# Patient Record
Sex: FEMALE | Race: WHITE | NOT HISPANIC OR LATINO | URBAN - METROPOLITAN AREA
[De-identification: names, ages, dates, MRNs, and addresses within clinical notes are randomized per-mention and may not be internally consistent; named-entity substitution may affect disease eponyms.]

---

## 2020-06-05 ENCOUNTER — OFFICE VISIT (OUTPATIENT)
Dept: URBAN - METROPOLITAN AREA TELEHEALTH 2 | Facility: TELEHEALTH | Age: 40
End: 2020-06-05
Payer: COMMERCIAL

## 2020-06-05 DIAGNOSIS — K62.5 RECTAL BLEEDING: ICD-10-CM

## 2020-06-05 DIAGNOSIS — R94.5 ABNORMAL LIVER FUCTION TESTS: ICD-10-CM

## 2020-06-05 DIAGNOSIS — K51.80 OTHER ULCERATIVE COLITIS WITHOUT COMPLICATION: ICD-10-CM

## 2020-06-05 PROCEDURE — 99213 OFFICE O/P EST LOW 20 MIN: CPT | Performed by: NURSE PRACTITIONER

## 2020-06-05 PROCEDURE — G8427 DOCREV CUR MEDS BY ELIG CLIN: HCPCS | Performed by: NURSE PRACTITIONER

## 2020-06-05 RX ORDER — BUDESONIDE 28 MG/1
1 APPLICATION BID X 2 WEEKS THEN QHS X 4 WEEKS AEROSOL, FOAM RECTAL TWICE A DAY
Qty: 4 CARTRIDGE | Refills: 2 | OUTPATIENT
Start: 2020-06-05 | End: 2020-09-03

## 2020-06-05 RX ORDER — INFLIXIMAB 100 MG/10ML
INFUSE 5 MG/KG OVER NO LESS THAN 2-6 HOUR(S) BY INTRAVENOUS ROUTE EVERY 8 WEEKS. PRE-MEDICATE WITH TYLENOL 650MG PO & BENADRYL 25MG IV PRIOR TO EACH INFUSION INJECTION, POWDER, LYOPHILIZED, FOR SOLUTION INTRAVENOUS
Qty: 1 | Refills: 6 | COMMUNITY
Start: 2017-08-31

## 2020-06-05 RX ORDER — CHLORHEXIDINE GLUCONATE 4 %
LIQUID (ML) TOPICAL
Qty: 0 | Refills: 0 | COMMUNITY
Start: 1900-01-01

## 2020-06-05 RX ORDER — MESALAMINE 1000 MG/1
INSERT 1 SUPPOSITORY BY RECTAL ROUTE ONCE A DAY (AT BEDTIME) SUPPOSITORY RECTAL 1
Qty: 30 | Refills: 11 | COMMUNITY
Start: 2019-11-08 | End: 2020-11-02

## 2020-06-05 NOTE — HPI-TODAY'S VISIT:
2020 Margy presents for follow up of UP. She is doing well today. Her symptoms have resolved. She is doing well on Remicade with normal BMs or bleeding. MB  2020 Margy presents for follow up fo UP. She is doing better in regard to her syptoms with no further bleeding. 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 MB  3/24/20 Margy presents today for a telehealth visit via [email or web address].  She was having bleeding at our last visit.  She did uceris foam and colace.  Now she is working 2 jobs and she has had increase stress.  She feels that things are taking a toll, but she is almost down to one shift.  So, now she is having bloating, cramping and mucus with some blood.  She started using the foam, and she is now better.  She is concerned because she feels that she is getting exposed to COVID 19 patients.  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  WE have discussed her increased risk and the importance of her maintaining on Remicade.  15 minutes were spent on this video call  19 Margy presents for follow up of UC on Remicade 5mg/kg every 8 weeks. Since her last visit she developed constipation x 5 days with rectal pain and impaction. She had BRBPR on disimpaction and has had discomfort/bleeding since. She denies fever, chills, abdominal pain or diarrhea, her stools are actually formed. Her last colonoscpy was in 2018 by Dr. Gutierrez with mild rectal disease. MB   Patient seen today via telehealth by agreement and consent of patient in light of current COVID-19 pandemic. I used video conferencing during the visit. The patient encounter is appropriate and reasonable under the circumstances given the patient's particular presentation at this time. The patient has been advised of the followin) the potential risks and limitations of this mode of treatment (including but not limited to the absence of in-person examination); 2) the right to refuse telehealth services at any point without affecting the right to future care; 3) the right to receive in-person services, included immediately after this consultation if an urgent need arises; 4) information, including identifiable images or information from this telehealth consult, will only be shared in accordance with HIPAA regulations. Any and all of the patient's and/or patient's family member's questions on this issue have been answered. The patient has verbally consented to be treated via telehealth services. The patient has also been advised to contact this office for worsening conditions or problems, and seek emergency medical treatment and/or call 911 if the patient deems either necessary.

## 2020-07-02 ENCOUNTER — OFFICE VISIT (OUTPATIENT)
Dept: URBAN - METROPOLITAN AREA TELEHEALTH 2 | Facility: TELEHEALTH | Age: 40
End: 2020-07-02
Payer: COMMERCIAL

## 2020-07-02 ENCOUNTER — TELEPHONE ENCOUNTER (OUTPATIENT)
Dept: URBAN - NONMETROPOLITAN AREA CLINIC 2 | Facility: CLINIC | Age: 40
End: 2020-07-02

## 2020-07-02 DIAGNOSIS — K51.80 OTHER ULCERATIVE COLITIS WITHOUT COMPLICATION: ICD-10-CM

## 2020-07-02 DIAGNOSIS — R94.5 ABNORMAL LIVER FUCTION TESTS: ICD-10-CM

## 2020-07-02 DIAGNOSIS — K62.5 RECTAL BLEEDING: ICD-10-CM

## 2020-07-02 PROCEDURE — 99213 OFFICE O/P EST LOW 20 MIN: CPT | Performed by: NURSE PRACTITIONER

## 2020-07-02 PROCEDURE — 1036F TOBACCO NON-USER: CPT | Performed by: NURSE PRACTITIONER

## 2020-07-02 PROCEDURE — G8420 CALC BMI NORM PARAMETERS: HCPCS | Performed by: NURSE PRACTITIONER

## 2020-07-02 PROCEDURE — G9903 PT SCRN TBCO ID AS NON USER: HCPCS | Performed by: NURSE PRACTITIONER

## 2020-07-02 PROCEDURE — G8427 DOCREV CUR MEDS BY ELIG CLIN: HCPCS | Performed by: NURSE PRACTITIONER

## 2020-07-02 RX ORDER — BUDESONIDE 28 MG/1
1 APPLICATION BID X 2 WEEKS THEN QHS X 4 WEEKS AEROSOL, FOAM RECTAL TWICE A DAY
Qty: 4 CARTRIDGE | Refills: 2 | Status: ACTIVE | COMMUNITY
Start: 2020-06-05 | End: 2020-09-03

## 2020-07-02 RX ORDER — MESALAMINE 1000 MG/1
INSERT 1 SUPPOSITORY BY RECTAL ROUTE ONCE A DAY (AT BEDTIME) SUPPOSITORY RECTAL 1
Qty: 30 | Refills: 11 | COMMUNITY
Start: 2019-11-08 | End: 2020-11-02

## 2020-07-02 RX ORDER — CHLORHEXIDINE GLUCONATE 4 %
LIQUID (ML) TOPICAL
Qty: 0 | Refills: 0 | COMMUNITY
Start: 1900-01-01

## 2020-07-02 RX ORDER — INFLIXIMAB 100 MG/10ML
INFUSE 5 MG/KG OVER NO LESS THAN 2-6 HOUR(S) BY INTRAVENOUS ROUTE EVERY 8 WEEKS. PRE-MEDICATE WITH TYLENOL 650MG PO & BENADRYL 25MG IV PRIOR TO EACH INFUSION INJECTION, POWDER, LYOPHILIZED, FOR SOLUTION INTRAVENOUS
Qty: 1 | Refills: 6 | COMMUNITY
Start: 2017-08-31

## 2020-07-10 ENCOUNTER — OFFICE VISIT (OUTPATIENT)
Dept: URBAN - NONMETROPOLITAN AREA CLINIC 13 | Facility: CLINIC | Age: 40
End: 2020-07-10

## 2020-10-01 ENCOUNTER — OFFICE VISIT (OUTPATIENT)
Dept: URBAN - METROPOLITAN AREA TELEHEALTH 2 | Facility: TELEHEALTH | Age: 40
End: 2020-10-01

## 2020-10-01 RX ORDER — INFLIXIMAB 100 MG/10ML
INFUSE 5 MG/KG OVER NO LESS THAN 2-6 HOUR(S) BY INTRAVENOUS ROUTE EVERY 8 WEEKS. PRE-MEDICATE WITH TYLENOL 650MG PO & BENADRYL 25MG IV PRIOR TO EACH INFUSION INJECTION, POWDER, LYOPHILIZED, FOR SOLUTION INTRAVENOUS
Qty: 1 | Refills: 6 | COMMUNITY
Start: 2017-08-31

## 2020-10-01 RX ORDER — MESALAMINE 1000 MG/1
INSERT 1 SUPPOSITORY BY RECTAL ROUTE ONCE A DAY (AT BEDTIME) SUPPOSITORY RECTAL 1
Qty: 30 | Refills: 11 | COMMUNITY
Start: 2019-11-08 | End: 2020-11-02

## 2020-10-01 RX ORDER — CHLORHEXIDINE GLUCONATE 4 %
LIQUID (ML) TOPICAL
Qty: 0 | Refills: 0 | COMMUNITY
Start: 1900-01-01

## 2020-10-28 ENCOUNTER — TELEPHONE ENCOUNTER (OUTPATIENT)
Dept: URBAN - NONMETROPOLITAN AREA CLINIC 2 | Facility: CLINIC | Age: 40
End: 2020-10-28

## 2020-10-28 RX ORDER — BUDESONIDE 28 MG/1
1 APPLICATION BID X 2 WEEKS THEN QHS X 4 WEEKS AEROSOL, FOAM RECTAL TWICE A DAY
Qty: 4 CARTRIDGE | Refills: 2 | OUTPATIENT
Start: 2020-10-28 | End: 2021-01-25

## 2020-10-28 RX ORDER — INFLIXIMAB 100 MG/10ML
INFUSE 5 MG/KG OVER NO LESS THAN 2-6 HOUR(S) BY INTRAVENOUS ROUTE EVERY 8 WEEKS. PRE-MEDICATE WITH TYLENOL 650MG PO & BENADRYL 25MG IV PRIOR TO EACH INFUSION INJECTION, POWDER, LYOPHILIZED, FOR SOLUTION INTRAVENOUS
Qty: 1 | Refills: 6 | COMMUNITY
Start: 2017-08-31

## 2020-10-28 RX ORDER — MESALAMINE 1000 MG/1
INSERT 1 SUPPOSITORY BY RECTAL ROUTE ONCE A DAY (AT BEDTIME) SUPPOSITORY RECTAL 1
Qty: 30 | Refills: 11 | COMMUNITY
Start: 2019-11-08 | End: 2020-11-02

## 2020-10-28 RX ORDER — CHLORHEXIDINE GLUCONATE 4 %
LIQUID (ML) TOPICAL
Qty: 0 | Refills: 0 | COMMUNITY
Start: 1900-01-01

## 2020-11-10 ENCOUNTER — TELEPHONE ENCOUNTER (OUTPATIENT)
Dept: URBAN - NONMETROPOLITAN AREA CLINIC 2 | Facility: CLINIC | Age: 40
End: 2020-11-10

## 2020-12-21 ENCOUNTER — TELEPHONE ENCOUNTER (OUTPATIENT)
Dept: URBAN - NONMETROPOLITAN AREA CLINIC 2 | Facility: CLINIC | Age: 40
End: 2020-12-21

## 2020-12-21 RX ORDER — CHLORHEXIDINE GLUCONATE 4 %
LIQUID (ML) TOPICAL
Qty: 0 | Refills: 0 | COMMUNITY
Start: 1900-01-01

## 2020-12-21 RX ORDER — INFLIXIMAB 100 MG/10ML
INFUSE 5 MG/KG OVER NO LESS THAN 2-6 HOUR(S) BY INTRAVENOUS ROUTE EVERY 8 WEEKS. PRE-MEDICATE WITH TYLENOL 650MG PO & BENADRYL 25MG IV PRIOR TO EACH INFUSION INJECTION, POWDER, LYOPHILIZED, FOR SOLUTION INTRAVENOUS
Qty: 1 | Refills: 6 | COMMUNITY
Start: 2017-08-31

## 2020-12-21 RX ORDER — BUDESONIDE 28 MG/1
1 APPLICATION BID X 2 WEEKS THEN QHS X 4 WEEKS AEROSOL, FOAM RECTAL TWICE A DAY
Qty: 4 CARTRIDGE | Refills: 2 | Status: ACTIVE | COMMUNITY
Start: 2020-10-28 | End: 2021-01-25

## 2020-12-21 RX ORDER — CYANOCOBALAMIN 1000 UG/ML
1 ML INJECTION INTRAMUSCULAR; SUBCUTANEOUS
Qty: 1 VIAL | Refills: 11 | OUTPATIENT
Start: 2020-12-29 | End: 2021-12-24

## 2021-01-27 ENCOUNTER — OFFICE VISIT (OUTPATIENT)
Dept: URBAN - METROPOLITAN AREA TELEHEALTH 2 | Facility: TELEHEALTH | Age: 41
End: 2021-01-27
Payer: COMMERCIAL

## 2021-01-27 ENCOUNTER — TELEPHONE ENCOUNTER (OUTPATIENT)
Dept: URBAN - NONMETROPOLITAN AREA CLINIC 2 | Facility: CLINIC | Age: 41
End: 2021-01-27

## 2021-01-27 ENCOUNTER — LAB OUTSIDE AN ENCOUNTER (OUTPATIENT)
Dept: URBAN - METROPOLITAN AREA TELEHEALTH 2 | Facility: TELEHEALTH | Age: 41
End: 2021-01-27

## 2021-01-27 DIAGNOSIS — K51.80 CHRONIC PANCOLONIC ULCERATIVE COLITIS: ICD-10-CM

## 2021-01-27 DIAGNOSIS — K62.5 RECTAL BLEEDING: ICD-10-CM

## 2021-01-27 PROCEDURE — 99443 PHONE E/M BY PHYS 21-30 MIN: CPT | Performed by: INTERNAL MEDICINE

## 2021-01-27 RX ORDER — INFLIXIMAB 100 MG/10ML
INFUSE 5 MG/KG OVER NO LESS THAN 2-6 HOUR(S) BY INTRAVENOUS ROUTE EVERY 8 WEEKS. PRE-MEDICATE WITH TYLENOL 650MG PO & BENADRYL 25MG IV PRIOR TO EACH INFUSION INJECTION, POWDER, LYOPHILIZED, FOR SOLUTION INTRAVENOUS
Qty: 1 | Refills: 6 | COMMUNITY
Start: 2017-08-31

## 2021-01-27 RX ORDER — SODIUM, POTASSIUM,MAG SULFATES 17.5-3.13G
354 ML AS DIRECTED SOLUTION, RECONSTITUTED, ORAL ORAL ONCE
Qty: 354 MILLILITER | Refills: 0 | OUTPATIENT
Start: 2021-01-27 | End: 2021-01-28

## 2021-01-27 RX ORDER — PREDNISONE 10 MG/1
4 TAB PO QD X 5 D, 3 TAB PO QD X 5 DAYS, 2 TAB PO QD X  5 D, 1 TAB PO QD X 5 DAYS, STOP TABLET ORAL AS DIRECTED
Qty: 50 TABLET | Refills: 0 | OUTPATIENT
Start: 2021-01-27 | End: 2021-02-21

## 2021-01-27 RX ORDER — CYANOCOBALAMIN 1000 UG/ML
1 ML INJECTION INTRAMUSCULAR; SUBCUTANEOUS
Qty: 1 VIAL | Refills: 11 | Status: ACTIVE | COMMUNITY
Start: 2020-12-29 | End: 2021-12-24

## 2021-01-27 RX ORDER — CHLORHEXIDINE GLUCONATE 4 %
LIQUID (ML) TOPICAL
Qty: 0 | Refills: 0 | COMMUNITY
Start: 1900-01-01

## 2021-01-27 NOTE — HPI-TODAY'S VISIT:
11/11/19 Margy presents for follow up of UC on Remicade 5mg/kg every 8 weeks. Since her last visit she developed constipation x 5 days with rectal pain and impaction. She had BRBPR on disimpaction and has had discomfort/bleeding since. She denies fever, chills, abdominal pain or diarrhea, her stools are actually formed. Her last colonoscpy was in 11/2018 by Dr. Gutierrez with mild rectal disease. MB  3/24/20 Margy presents today for a telehealth visit via [employer].  She was having bleeding at our last visit.  She did uceris foam and colace.  Now she is working 2 jobs and she has had increase stress.  She feels that things are taking a toll, but she is almost down to one shift.  So, now she is having bloating, cramping and mucus with some blood.  She started using the foam, and she is now better.  She is concerned because she feels that she is getting exposed to COVID 19 patients.  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  WE have discussed her increased risk and the importance of her maintaining on Remicade.  4/29/2020 Margy presents for follow up fo UP. She is doing better in regard to her syptoms with no further bleeding. 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 MB  6/5/2020 Margy presents for follow up of UP. She is doing well today. Her symptoms have resolved. She is doing well on Remicade with normal BMs or bleeding. MB  7/2/2020 Margy presents for follow up of UP. She is still doing well with no further flares. She is preparing to re-enter work and anxious, but plans to discuss her options with her employer. MB  1/27/2021 Margy presents for followup of UP. Since her last visit she has had a flare with mucous, bleeding and diarrhea. Her last remicade infusion at 5mg/kg eveyr 8 weeks was in the first of January. She states she didn't feel better. She has used the uceris on and off for mild bleeding. She states she usually goes 1-3 times a week. Her stools are soft when she does have a BM. She has tensmus and has passed a large amount mucous and blood today. She is having some discomfort. She agrees her diet has been poor. Today she is not doing well. MB

## 2021-02-16 ENCOUNTER — ERX REFILL RESPONSE (OUTPATIENT)
Dept: URBAN - METROPOLITAN AREA TELEHEALTH 2 | Facility: TELEHEALTH | Age: 41
End: 2021-02-16

## 2021-02-16 RX ORDER — PREDNISONE 10 MG/1
4 TAB PO QD X 5 D, 3 TAB PO QD X 5 DAYS, 2 TAB PO QD X  5 D, 1 TAB PO QD X 5 DAYS, STOP TABLET ORAL AS DIRECTED
Qty: 50 | Refills: 0

## 2021-02-24 ENCOUNTER — CLAIMS CREATED FROM THE CLAIM WINDOW (OUTPATIENT)
Dept: URBAN - METROPOLITAN AREA CLINIC 4 | Facility: CLINIC | Age: 41
End: 2021-02-24
Payer: COMMERCIAL

## 2021-02-24 ENCOUNTER — OFFICE VISIT (OUTPATIENT)
Dept: URBAN - NONMETROPOLITAN AREA SURGERY CENTER 1 | Facility: SURGERY CENTER | Age: 41
End: 2021-02-24
Payer: COMMERCIAL

## 2021-02-24 DIAGNOSIS — K51.40 INFLAMMATORY POLYP OF COLON: ICD-10-CM

## 2021-02-24 DIAGNOSIS — K51.40 INFLAMMATORY POLYPS OF COLON WITHOUT COMPLICATIONS: ICD-10-CM

## 2021-02-24 DIAGNOSIS — K51.20 ULCERATIVE PROCTITIS WITHOUT COMPLICATION: ICD-10-CM

## 2021-02-24 DIAGNOSIS — K63.89 MASS OF HEPATIC FLEXURE OF COLON: ICD-10-CM

## 2021-02-24 DIAGNOSIS — K51.911 ULCERATIVE COLITIS, UNSPECIFIED WITH RECTAL BLEEDING: ICD-10-CM

## 2021-02-24 PROCEDURE — G8907 PT DOC NO EVENTS ON DISCHARG: HCPCS | Performed by: INTERNAL MEDICINE

## 2021-02-24 PROCEDURE — 45385 COLONOSCOPY W/LESION REMOVAL: CPT | Performed by: INTERNAL MEDICINE

## 2021-02-24 PROCEDURE — 88342 IMHCHEM/IMCYTCHM 1ST ANTB: CPT | Performed by: PATHOLOGY

## 2021-02-24 PROCEDURE — 88305 TISSUE EXAM BY PATHOLOGIST: CPT | Performed by: PATHOLOGY

## 2021-02-24 PROCEDURE — 88341 IMHCHEM/IMCYTCHM EA ADD ANTB: CPT | Performed by: PATHOLOGY

## 2021-02-24 PROCEDURE — 45380 COLONOSCOPY AND BIOPSY: CPT | Performed by: INTERNAL MEDICINE

## 2021-03-23 ENCOUNTER — TELEPHONE ENCOUNTER (OUTPATIENT)
Dept: URBAN - METROPOLITAN AREA CLINIC 92 | Facility: CLINIC | Age: 41
End: 2021-03-23

## 2021-03-25 ENCOUNTER — OFFICE VISIT (OUTPATIENT)
Dept: URBAN - NONMETROPOLITAN AREA CLINIC 2 | Facility: CLINIC | Age: 41
End: 2021-03-25
Payer: COMMERCIAL

## 2021-03-25 VITALS
WEIGHT: 152 LBS | SYSTOLIC BLOOD PRESSURE: 145 MMHG | DIASTOLIC BLOOD PRESSURE: 84 MMHG | BODY MASS INDEX: 25.33 KG/M2 | TEMPERATURE: 97.1 F | HEIGHT: 65 IN | HEART RATE: 69 BPM

## 2021-03-25 DIAGNOSIS — Z12.11 COLON CANCER SCREENING: ICD-10-CM

## 2021-03-25 DIAGNOSIS — K62.5 RECTAL BLEEDING: ICD-10-CM

## 2021-03-25 DIAGNOSIS — R94.5 ABNORMAL LIVER FUCTION TESTS: ICD-10-CM

## 2021-03-25 DIAGNOSIS — K51.90 ULCERATIVE COLITIS: ICD-10-CM

## 2021-03-25 PROCEDURE — 99214 OFFICE O/P EST MOD 30 MIN: CPT | Performed by: NURSE PRACTITIONER

## 2021-03-25 RX ORDER — DIPHENHYDRAMINE HCL 2 %
AS DIRECTED CREAM (GRAM) TOPICAL
Qty: 30 | Refills: 0 | OUTPATIENT
Start: 2021-03-25 | End: 2021-04-24

## 2021-03-25 RX ORDER — ACETAMINOPHEN 650 MG
2 TABLETS AS NEEDED TABLET, EXTENDED RELEASE ORAL
Qty: 10 | Refills: 0 | OUTPATIENT
Start: 2021-03-25 | End: 2021-03-26

## 2021-03-25 RX ORDER — CYANOCOBALAMIN 1000 UG/ML
1 ML INJECTION INTRAMUSCULAR; SUBCUTANEOUS
Qty: 1 VIAL | Refills: 11 | Status: ACTIVE | COMMUNITY
Start: 2020-12-29 | End: 2021-12-24

## 2021-03-25 RX ORDER — INFLIXIMAB 100 MG/10ML
AS DIRECTED INJECTION, POWDER, LYOPHILIZED, FOR SOLUTION INTRAVENOUS
Qty: 1 | Refills: 0 | OUTPATIENT
Start: 2021-03-25 | End: 2021-03-26

## 2021-03-25 RX ORDER — PREDNISONE 10 MG/1
4 TAB PO QD X 5 D, 3 TAB PO QD X 5 DAYS, 2 TAB PO QD X  5 D, 1 TAB PO QD X 5 DAYS, STOP TABLET ORAL AS DIRECTED
Qty: 50 | Refills: 0 | Status: ON HOLD | COMMUNITY

## 2021-03-25 RX ORDER — INFLIXIMAB 100 MG/10ML
INFUSE 5 MG/KG OVER NO LESS THAN 2-6 HOUR(S) BY INTRAVENOUS ROUTE EVERY 8 WEEKS. PRE-MEDICATE WITH TYLENOL 650MG PO & BENADRYL 25MG IV PRIOR TO EACH INFUSION INJECTION, POWDER, LYOPHILIZED, FOR SOLUTION INTRAVENOUS
Qty: 1 | Refills: 6 | Status: ACTIVE | COMMUNITY
Start: 2017-08-31

## 2021-03-25 RX ORDER — CHLORHEXIDINE GLUCONATE 4 %
LIQUID (ML) TOPICAL
Qty: 0 | Refills: 0 | COMMUNITY
Start: 1900-01-01

## 2021-03-25 NOTE — HPI-TODAY'S VISIT:
11/11/19 Margy presents for follow up of UC on Remicade 5mg/kg every 8 weeks. Since her last visit she developed constipation x 5 days with rectal pain and impaction. She had BRBPR on disimpaction and has had discomfort/bleeding since. She denies fever, chills, abdominal pain or diarrhea, her stools are actually formed. Her last colonoscpy was in 11/2018 by Dr. Gutierrez with mild rectal disease. MB  3/24/20 Margy presents today for a telehealth visit via [employer].  She was having bleeding at our last visit.  She did uceris foam and colace.  Now she is working 2 jobs and she has had increase stress.  She feels that things are taking a toll, but she is almost down to one shift.  So, now she is having bloating, cramping and mucus with some blood.  She started using the foam, and she is now better.  She is concerned because she feels that she is getting exposed to COVID 19 patients.  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  WE have discussed her increased risk and the importance of her maintaining on Remicade.  4/29/2020 Margy presents for follow up fo UP. She is doing better in regard to her syptoms with no further bleeding. 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 MB  6/5/2020 Margy presents for follow up of UP. She is doing well today. Her symptoms have resolved. She is doing well on Remicade with normal BMs or bleeding. MB  7/2/2020 Margy presents for follow up of UP. She is still doing well with no further flares. She is preparing to re-enter work and anxious, but plans to discuss her options with her employer. MB  1/27/2021 Margy presents for followup of UP. Since her last visit she has had a flare with mucous, bleeding and diarrhea. Her last remicade infusion at 5mg/kg eveyr 8 weeks was in the first of January. She states she didn't feel better. She has used the uceris on and off for mild bleeding. She states she usually goes 1-3 times a week. Her stools are soft when she does have a BM. She has tensmus and has passed a large amount mucous and blood today. She is having some discomfort. She agrees her diet has been poor. Today she is not doing well. MB  3/25/2021 Margy presents for follow-up of ulcerative colitis status post colonoscopy with active proctitis.  She states 2 months ago when she got her Remicade infusion she felt like the drug did not work at all as usual.  She required a long steroid taper for relief of bleeding, mucus and tenesmus.  Since completing the taper she still has active inflammation and on her colonoscopy recently done by Dr. Prieto and continues to struggle with mucus and tenesmus.  Today we had a long discussion and she does feel like Remicade works well, however recently she has noticed a decrease in symptom control later in the weeks after her infusion and no improvement with her most recent infusion.  She agrees to check antibody levels today.  If these are normal she would like to increase the dose and the frequency.  MB

## 2021-04-03 LAB
A/G RATIO: 1.5
ALBUMIN: 4.5
ALKALINE PHOSPHATASE: 34
ALT (SGPT): 14
ANTI-INFLIXIMAB ANTIBODY: <22
AST (SGOT): 19
BASO (ABSOLUTE): 0
BASOS: 1
BILIRUBIN, TOTAL: 0.3
BUN/CREATININE RATIO: 16
BUN: 12
CALCIUM: 9.1
CARBON DIOXIDE, TOTAL: 24
CHLORIDE: 105
CREATININE: 0.77
EGFR IF AFRICN AM: 110
EGFR IF NONAFRICN AM: 96
EOS (ABSOLUTE): 0.1
EOS: 1
GLOBULIN, TOTAL: 3
GLUCOSE: 78
HEMATOCRIT: 38.7
HEMATOLOGY COMMENTS:: (no result)
HEMOGLOBIN: 12.9
IMMATURE CELLS: (no result)
IMMATURE GRANS (ABS): 0
IMMATURE GRANULOCYTES: 0
INFLIXIMAB DRUG LEVEL: 16
LYMPHS (ABSOLUTE): 2.8
LYMPHS: 46
MCH: 30.3
MCHC: 33.3
MCV: 91
MONOCYTES(ABSOLUTE): 0.4
MONOCYTES: 6
NEUTROPHILS (ABSOLUTE): 2.7
NEUTROPHILS: 46
NRBC: (no result)
PLATELETS: 259
POTASSIUM: 4.6
PROTEIN, TOTAL: 7.5
RBC: 4.26
RDW: 12.5
SODIUM: 142
WBC: 5.9

## 2021-04-12 ENCOUNTER — TELEPHONE ENCOUNTER (OUTPATIENT)
Dept: URBAN - NONMETROPOLITAN AREA CLINIC 2 | Facility: CLINIC | Age: 41
End: 2021-04-12

## 2021-04-12 RX ORDER — DIPHENHYDRAMINE HCL 2 %
AS DIRECTED CREAM (GRAM) TOPICAL
Qty: 30 | Refills: 0 | Status: ACTIVE | COMMUNITY
Start: 2021-03-25 | End: 2021-04-24

## 2021-04-12 RX ORDER — INFLIXIMAB 100 MG/10ML
INFUSE 5 MG/KG OVER NO LESS THAN 2-6 HOUR(S) BY INTRAVENOUS ROUTE EVERY 8 WEEKS. PRE-MEDICATE WITH TYLENOL 650MG PO & BENADRYL 25MG IV PRIOR TO EACH INFUSION INJECTION, POWDER, LYOPHILIZED, FOR SOLUTION INTRAVENOUS
Qty: 1 | Refills: 6 | Status: ACTIVE | COMMUNITY
Start: 2017-08-31

## 2021-04-12 RX ORDER — PREDNISONE 10 MG/1
4 TAB PO QD X 5 D, 3 TAB PO QD X 5 DAYS, 2 TAB PO QD X  5 D, 1 TAB PO QD X 5 DAYS, STOP TABLET ORAL AS DIRECTED
Qty: 50 | Refills: 0 | Status: ON HOLD | COMMUNITY

## 2021-04-12 RX ORDER — HYDROCORTISONE 100 MG/60ML
60 ML IN THE EVENING ENEMA RECTAL QHS
Qty: 14 APPLICATOR | Refills: 2 | OUTPATIENT
Start: 2021-04-14 | End: 2021-05-26

## 2021-04-12 RX ORDER — CHLORHEXIDINE GLUCONATE 4 %
LIQUID (ML) TOPICAL
Qty: 0 | Refills: 0 | COMMUNITY
Start: 1900-01-01

## 2021-04-12 RX ORDER — CYANOCOBALAMIN 1000 UG/ML
1 ML INJECTION INTRAMUSCULAR; SUBCUTANEOUS
Qty: 1 VIAL | Refills: 11 | Status: ACTIVE | COMMUNITY
Start: 2020-12-29 | End: 2021-12-24

## 2021-04-19 ENCOUNTER — TELEPHONE ENCOUNTER (OUTPATIENT)
Dept: URBAN - METROPOLITAN AREA CLINIC 23 | Facility: CLINIC | Age: 41
End: 2021-04-19

## 2021-04-21 ENCOUNTER — TELEPHONE ENCOUNTER (OUTPATIENT)
Dept: URBAN - NONMETROPOLITAN AREA CLINIC 2 | Facility: CLINIC | Age: 41
End: 2021-04-21

## 2021-04-28 ENCOUNTER — TELEPHONE ENCOUNTER (OUTPATIENT)
Dept: URBAN - NONMETROPOLITAN AREA CLINIC 2 | Facility: CLINIC | Age: 41
End: 2021-04-28

## 2021-06-07 ENCOUNTER — TELEPHONE ENCOUNTER (OUTPATIENT)
Dept: URBAN - NONMETROPOLITAN AREA CLINIC 2 | Facility: CLINIC | Age: 41
End: 2021-06-07

## 2021-06-07 RX ORDER — INFLIXIMAB 100 MG/1
AS DIRECTED INJECTION, POWDER, LYOPHILIZED, FOR SOLUTION INTRAVENOUS
OUTPATIENT
Start: 2021-06-16

## 2021-06-08 ENCOUNTER — TELEPHONE ENCOUNTER (OUTPATIENT)
Dept: URBAN - NONMETROPOLITAN AREA CLINIC 2 | Facility: CLINIC | Age: 41
End: 2021-06-08

## 2021-06-08 ENCOUNTER — LAB OUTSIDE AN ENCOUNTER (OUTPATIENT)
Dept: URBAN - NONMETROPOLITAN AREA CLINIC 2 | Facility: CLINIC | Age: 41
End: 2021-06-08

## 2021-06-08 RX ORDER — CYANOCOBALAMIN 1000 UG/ML
1 ML INJECTION INTRAMUSCULAR; SUBCUTANEOUS
Qty: 1 VIAL | Refills: 11 | Status: ACTIVE | COMMUNITY
Start: 2020-12-29 | End: 2021-12-24

## 2021-06-08 RX ORDER — CHLORHEXIDINE GLUCONATE 4 %
LIQUID (ML) TOPICAL
Qty: 0 | Refills: 0 | COMMUNITY
Start: 1900-01-01

## 2021-06-08 RX ORDER — PREDNISONE 10 MG/1
4 TAB PO QD X 5 D, 3 TAB PO QD X 5 DAYS, 2 TAB PO QD X  5 D, 1 TAB PO QD X 5 DAYS, STOP TABLET ORAL AS DIRECTED
Qty: 50 TABLET | Refills: 0 | OUTPATIENT
Start: 2021-06-08 | End: 2021-07-03

## 2021-06-08 RX ORDER — PREDNISONE 10 MG/1
4 TAB PO QD X 5 D, 3 TAB PO QD X 5 DAYS, 2 TAB PO QD X  5 D, 1 TAB PO QD X 5 DAYS, STOP TABLET ORAL AS DIRECTED
Qty: 50 | Refills: 0 | Status: ON HOLD | COMMUNITY

## 2021-06-08 RX ORDER — INFLIXIMAB 100 MG/10ML
INFUSE 5 MG/KG OVER NO LESS THAN 2-6 HOUR(S) BY INTRAVENOUS ROUTE EVERY 8 WEEKS. PRE-MEDICATE WITH TYLENOL 650MG PO & BENADRYL 25MG IV PRIOR TO EACH INFUSION INJECTION, POWDER, LYOPHILIZED, FOR SOLUTION INTRAVENOUS
Qty: 1 | Refills: 6 | Status: ACTIVE | COMMUNITY
Start: 2017-08-31

## 2021-06-10 ENCOUNTER — TELEPHONE ENCOUNTER (OUTPATIENT)
Dept: URBAN - METROPOLITAN AREA CLINIC 92 | Facility: CLINIC | Age: 41
End: 2021-06-10

## 2021-06-14 ENCOUNTER — LAB OUTSIDE AN ENCOUNTER (OUTPATIENT)
Dept: URBAN - NONMETROPOLITAN AREA CLINIC 2 | Facility: CLINIC | Age: 41
End: 2021-06-14

## 2021-06-24 ENCOUNTER — OFFICE VISIT (OUTPATIENT)
Dept: URBAN - NONMETROPOLITAN AREA CLINIC 13 | Facility: CLINIC | Age: 41
End: 2021-06-24

## 2021-06-24 RX ORDER — CYANOCOBALAMIN 1000 UG/ML
1 ML INJECTION INTRAMUSCULAR; SUBCUTANEOUS
Qty: 1 VIAL | Refills: 11 | Status: ACTIVE | COMMUNITY
Start: 2020-12-29 | End: 2021-12-24

## 2021-06-24 RX ORDER — INFLIXIMAB 100 MG/1
AS DIRECTED INJECTION, POWDER, LYOPHILIZED, FOR SOLUTION INTRAVENOUS
Status: ACTIVE | COMMUNITY
Start: 2021-06-16

## 2021-06-24 RX ORDER — PREDNISONE 10 MG/1
4 TAB PO QD X 5 D, 3 TAB PO QD X 5 DAYS, 2 TAB PO QD X  5 D, 1 TAB PO QD X 5 DAYS, STOP TABLET ORAL AS DIRECTED
Qty: 50 | Refills: 0 | Status: ON HOLD | COMMUNITY

## 2021-06-24 RX ORDER — CHLORHEXIDINE GLUCONATE 4 %
LIQUID (ML) TOPICAL
Qty: 0 | Refills: 0 | COMMUNITY
Start: 1900-01-01

## 2021-06-24 RX ORDER — PREDNISONE 10 MG/1
4 TAB PO QD X 5 D, 3 TAB PO QD X 5 DAYS, 2 TAB PO QD X  5 D, 1 TAB PO QD X 5 DAYS, STOP TABLET ORAL AS DIRECTED
Qty: 50 TABLET | Refills: 0 | Status: ACTIVE | COMMUNITY
Start: 2021-06-08 | End: 2021-07-03

## 2021-06-24 RX ORDER — INFLIXIMAB 100 MG/10ML
INFUSE 5 MG/KG OVER NO LESS THAN 2-6 HOUR(S) BY INTRAVENOUS ROUTE EVERY 8 WEEKS. PRE-MEDICATE WITH TYLENOL 650MG PO & BENADRYL 25MG IV PRIOR TO EACH INFUSION INJECTION, POWDER, LYOPHILIZED, FOR SOLUTION INTRAVENOUS
Qty: 1 | Refills: 6 | Status: ACTIVE | COMMUNITY
Start: 2017-08-31

## 2021-08-02 ENCOUNTER — TELEPHONE ENCOUNTER (OUTPATIENT)
Dept: URBAN - NONMETROPOLITAN AREA CLINIC 2 | Facility: CLINIC | Age: 41
End: 2021-08-02

## 2021-08-02 RX ORDER — PREDNISONE 10 MG/1
4 TAB PO X 5D, 3 TAB PO X 5D 2 TAB PO X 5 DAYS 1TAB PO X 5 DAYS TABLET ORAL ONCE A DAY
Qty: 50 TABLET | Refills: 0 | OUTPATIENT
Start: 2021-08-02 | End: 2021-08-22

## 2021-08-25 ENCOUNTER — OFFICE VISIT (OUTPATIENT)
Dept: URBAN - NONMETROPOLITAN AREA CLINIC 2 | Facility: CLINIC | Age: 41
End: 2021-08-25

## 2021-09-14 ENCOUNTER — TELEPHONE ENCOUNTER (OUTPATIENT)
Dept: URBAN - METROPOLITAN AREA CLINIC 92 | Facility: CLINIC | Age: 41
End: 2021-09-14

## 2022-01-18 ENCOUNTER — TELEPHONE ENCOUNTER (OUTPATIENT)
Dept: URBAN - NONMETROPOLITAN AREA CLINIC 2 | Facility: CLINIC | Age: 42
End: 2022-01-18

## 2022-06-15 ENCOUNTER — TELEPHONE ENCOUNTER (OUTPATIENT)
Dept: URBAN - NONMETROPOLITAN AREA CLINIC 2 | Facility: CLINIC | Age: 42
End: 2022-06-15

## 2022-06-15 RX ORDER — INFLIXIMAB 100 MG/10ML
AS DIRECTED INJECTION, POWDER, LYOPHILIZED, FOR SOLUTION INTRAVENOUS
Qty: 100 MILLIGRAMS | Refills: 0 | OUTPATIENT
Start: 2022-06-15 | End: 2022-07-15

## 2022-07-22 ENCOUNTER — TELEPHONE ENCOUNTER (OUTPATIENT)
Dept: URBAN - NONMETROPOLITAN AREA CLINIC 13 | Facility: CLINIC | Age: 42
End: 2022-07-22

## 2022-07-22 RX ORDER — INFLIXIMAB 100 MG/10ML
AS DIRECTED INJECTION, POWDER, LYOPHILIZED, FOR SOLUTION INTRAVENOUS
Qty: 100 MILLIGRAMS | Refills: 0 | OUTPATIENT
Start: 2022-07-27 | End: 2022-08-26

## 2022-08-10 ENCOUNTER — TELEPHONE ENCOUNTER (OUTPATIENT)
Dept: URBAN - NONMETROPOLITAN AREA CLINIC 2 | Facility: CLINIC | Age: 42
End: 2022-08-10

## 2022-08-10 RX ORDER — DIPHENHYDRAMINE HCL 2 %
1 CAPSULE AT BEDTIME AS NEEDED CREAM (GRAM) TOPICAL ONCE A DAY
Qty: 30 | Refills: 0 | OUTPATIENT
Start: 2022-08-10 | End: 2022-09-09

## 2022-08-10 RX ORDER — ACETAMINOPHEN 650 MG
2 TABLETS AS NEEDED TABLET, EXTENDED RELEASE ORAL
Qty: 6 TABLET | Refills: 0 | OUTPATIENT
Start: 2022-08-10 | End: 2022-08-11

## 2022-08-10 RX ORDER — INFLIXIMAB 100 MG/1
10MG/KG INJECTION, POWDER, LYOPHILIZED, FOR SOLUTION INTRAVENOUS
Qty: 4 UNSPECIFIED | Refills: 8 | OUTPATIENT
Start: 2022-08-10 | End: 2023-08-23

## 2022-09-06 ENCOUNTER — OFFICE VISIT (OUTPATIENT)
Dept: URBAN - NONMETROPOLITAN AREA CLINIC 2 | Facility: CLINIC | Age: 42
End: 2022-09-06

## 2022-11-02 ENCOUNTER — WEB ENCOUNTER (OUTPATIENT)
Dept: URBAN - NONMETROPOLITAN AREA CLINIC 2 | Facility: CLINIC | Age: 42
End: 2022-11-02

## 2022-11-08 ENCOUNTER — LAB OUTSIDE AN ENCOUNTER (OUTPATIENT)
Dept: URBAN - NONMETROPOLITAN AREA CLINIC 2 | Facility: CLINIC | Age: 42
End: 2022-11-08

## 2022-11-08 ENCOUNTER — OFFICE VISIT (OUTPATIENT)
Dept: URBAN - NONMETROPOLITAN AREA CLINIC 2 | Facility: CLINIC | Age: 42
End: 2022-11-08
Payer: COMMERCIAL

## 2022-11-08 VITALS
BODY MASS INDEX: 27.79 KG/M2 | HEART RATE: 81 BPM | TEMPERATURE: 97.8 F | HEIGHT: 65 IN | DIASTOLIC BLOOD PRESSURE: 84 MMHG | WEIGHT: 166.8 LBS | SYSTOLIC BLOOD PRESSURE: 133 MMHG

## 2022-11-08 DIAGNOSIS — K51.90 ULCERATIVE COLITIS: ICD-10-CM

## 2022-11-08 DIAGNOSIS — Z12.11 COLON CANCER SCREENING: ICD-10-CM

## 2022-11-08 DIAGNOSIS — K62.5 RECTAL BLEEDING: ICD-10-CM

## 2022-11-08 DIAGNOSIS — D72.829 LEUKOCYTOSIS, UNSPECIFIED: ICD-10-CM

## 2022-11-08 DIAGNOSIS — R94.5 ABNORMAL LIVER FUCTION TESTS: ICD-10-CM

## 2022-11-08 PROCEDURE — 99214 OFFICE O/P EST MOD 30 MIN: CPT | Performed by: NURSE PRACTITIONER

## 2022-11-08 RX ORDER — PREDNISONE 10 MG/1
4 TAB PO QD X 5 D, 3 TAB PO QD X 5 DAYS, 2 TAB PO QD X  5 D, 1 TAB PO QD X 5 DAYS, STOP TABLET ORAL AS DIRECTED
Qty: 50 | Refills: 0 | Status: ON HOLD | COMMUNITY

## 2022-11-08 RX ORDER — INFLIXIMAB 100 MG/1
10MG/KG INJECTION, POWDER, LYOPHILIZED, FOR SOLUTION INTRAVENOUS
Qty: 4 UNSPECIFIED | Refills: 8 | Status: ACTIVE | COMMUNITY
Start: 2022-08-10 | End: 2023-08-23

## 2022-11-08 RX ORDER — ACETAMINOPHEN 325 MG
2 TABLET TABLET ORAL
Qty: 1 UNSPECIFIED | Refills: 8 | OUTPATIENT
Start: 2022-11-08 | End: 2023-11-21

## 2022-11-08 RX ORDER — CHLORHEXIDINE GLUCONATE 4 %
LIQUID (ML) TOPICAL
Qty: 0 | Refills: 0 | COMMUNITY
Start: 1900-01-01

## 2022-11-08 RX ORDER — HYDROCORTISONE 100 MG/60ML
60 ML IN THE EVENING SUSPENSION RECTAL
Qty: 1680 ML | Refills: 2 | OUTPATIENT
Start: 2022-11-08 | End: 2022-12-20

## 2022-11-08 RX ORDER — SODIUM PICOSULFATE, MAGNESIUM OXIDE, AND ANHYDROUS CITRIC ACID 10; 3.5; 12 MG/160ML; G/160ML; G/160ML
160ML X 2 AS DIRECTED LIQUID ORAL ONCE
Qty: 320 MILLILITER | Refills: 0 | OUTPATIENT
Start: 2022-11-08 | End: 2022-11-09

## 2022-11-08 RX ORDER — INFLIXIMAB 100 MG/1
10MG/KG INJECTION, POWDER, LYOPHILIZED, FOR SOLUTION INTRAVENOUS
Qty: 1 UNSPECIFIED | Refills: 8 | OUTPATIENT
Start: 2022-11-08 | End: 2023-11-21

## 2022-11-08 RX ORDER — DIPHENHYDRAMINE HYDROCHLORIDE 12.5 MG/5ML
10 ML SOLUTION ORAL
Qty: 1 UNSPECIFIED | Refills: 8 | OUTPATIENT
Start: 2022-11-08

## 2022-11-08 RX ORDER — INFLIXIMAB 100 MG/10ML
INFUSE 5 MG/KG OVER NO LESS THAN 2-6 HOUR(S) BY INTRAVENOUS ROUTE EVERY 8 WEEKS. PRE-MEDICATE WITH TYLENOL 650MG PO & BENADRYL 25MG IV PRIOR TO EACH INFUSION INJECTION, POWDER, LYOPHILIZED, FOR SOLUTION INTRAVENOUS
Qty: 1 | Refills: 6 | Status: ON HOLD | COMMUNITY
Start: 2017-08-31

## 2022-11-08 NOTE — HPI-TODAY'S VISIT:
11/11/19 Margy presents for follow up of UC on Remicade 5mg/kg every 8 weeks. Since her last visit she developed constipation x 5 days with rectal pain and impaction. She had BRBPR on disimpaction and has had discomfort/bleeding since. She denies fever, chills, abdominal pain or diarrhea, her stools are actually formed. Her last colonoscpy was in 11/2018 by Dr. Gutierrez with mild rectal disease. MB  3/24/20 Margy presents today for a telehealth visit via [employer].  She was having bleeding at our last visit.  She did uceris foam and colace.  Now she is working 2 jobs and she has had increase stress.  She feels that things are taking a toll, but she is almost down to one shift.  So, now she is having bloating, cramping and mucus with some blood.  She started using the foam, and she is now better.  She is concerned because she feels that she is getting exposed to COVID 19 patients.  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  WE have discussed her increased risk and the importance of her maintaining on Remicade.  4/29/2020 Margy presents for follow up fo UP. She is doing better in regard to her syptoms with no further bleeding. 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 MB  6/5/2020 Margy presents for follow up of UP. She is doing well today. Her symptoms have resolved. She is doing well on Remicade with normal BMs or bleeding. MB  7/2/2020 Margy presents for follow up of UP. She is still doing well with no further flares. She is preparing to re-enter work and anxious, but plans to discuss her options with her employer. MB  1/27/2021 Margy presents for followup of UP. Since her last visit she has had a flare with mucous, bleeding and diarrhea. Her last remicade infusion at 5mg/kg eveyr 8 weeks was in the first of January. She states she didn't feel better. She has used the uceris on and off for mild bleeding. She states she usually goes 1-3 times a week. Her stools are soft when she does have a BM. She has tensmus and has passed a large amount mucous and blood today. She is having some discomfort. She agrees her diet has been poor. Today she is not doing well. MB  3/25/2021 Margy presents for follow-up of ulcerative colitis status post colonoscopy with active proctitis.  She states 2 months ago when she got her Remicade infusion she felt like the drug did not work at all as usual.  She required a long steroid taper for relief of bleeding, mucus and tenesmus.  Since completing the taper she still has active inflammation and on her colonoscopy recently done by Dr. Prieto and continues to struggle with mucus and tenesmus.  Today we had a long discussion and she does feel like Remicade works well, however recently she has noticed a decrease in symptom control later in the weeks after her infusion and no improvement with her most recent infusion.  She agrees to check antibody levels today.  If these are normal she would like to increase the dose and the frequency.  MB 11/8/2022 Margy presents for follow-up of ulcerative colitis.  Since her last visit we increased her Renflexis to 10 mg/kg every 6 weeks.  She is having 1-3 bowel movements daily, she still has some mild urgency and tenesmus along with mucus.  She feels best on Hernandez enemas as needed.  Her last colonoscopy was done in March 2021 with mild proctitis on exam, normal colonic mucosa, her biopsy show moderate active proctitis normal random colon biopsies.  Today she is due for repeat surveillance this spring, we will schedule this.  I am going to call in Hernandez enemas for her to use as needed as her disease is distal.  You serous foam has worked the best in the past but her insurance will not approve this.  Consider alternative therapies depending on her repeat surveillance colonoscopy plus or minus her clinical response.  MB

## 2022-12-01 ENCOUNTER — LAB OUTSIDE AN ENCOUNTER (OUTPATIENT)
Dept: URBAN - NONMETROPOLITAN AREA CLINIC 2 | Facility: CLINIC | Age: 42
End: 2022-12-01

## 2022-12-01 ENCOUNTER — TELEPHONE ENCOUNTER (OUTPATIENT)
Dept: URBAN - NONMETROPOLITAN AREA CLINIC 2 | Facility: CLINIC | Age: 42
End: 2022-12-01

## 2022-12-01 LAB
A/G RATIO: 1.2
ALBUMIN: 4.6
ALKALINE PHOSPHATASE: 33
ALT (SGPT): 19
ANION GAP: 13
AST (SGOT): 30
BASOPHILS AUTOMATED ABSOLUTE COUNT: 0.1
BASOPHILS RELATIVE PERCENT: 0.8
BILIRUBIN DIRECT: <0.1
BILIRUBIN TOTAL: 0.4
BLOOD UREA NITROGEN: 12
BUN / CREAT RATIO: 16
C-REACTIVE PROTEIN: 0.17
CALCIUM: 9.4
CHLORIDE: 106
CO2: 29
CREATININE, SERUM: 0.76
EGFR (CKD-EPI): >60
EOSINOPHILS AUTOMATED ABSOLUTE COUNT: 0.1
EOSINOPHILS RELATIVE PERCENT: 1.2
GLUCOSE: 83
HEMATOCRIT: 39.3
HEMOGLOBIN: 13.4
IRON SATURATION: 12
IRON: 44
LYMPHOCYTES AUTOMATED ABSOLUTE COUNT: 2
LYMPHOCYTES RELATIVE PERCENT: 26.8
MEAN CORPUSCULAR HEMOGLOBIN CONC: 34.1
MEAN CORPUSCULAR HEMOGLOBIN: 30.5
MEAN CORPUSCULAR VOLUME: 89.4
MEAN PLATELET VOLUME: 8
MONOCYTES AUTOMATED ABSOLUTE COUNT: 0.6
MONOCYTES RELATIVE PERCENT: 8.8
NEUTROPHILS AUTOMATED ABSOLUTE: 4.6
NEUTROPHILS RELATIVE PERCENT: 62.4
PLATELET COUNT: 288
POTASSIUM: 4.7
PROTEIN TOTAL: 8.3
RED BLOOD CELL COUNT: 4.4
RED CELL DISTRIBUTION WIDTH: 13
SEDIMENTATION RATE, ERYTHROCYTE: 21
SODIUM: 143
TOTAL IRON BINDING CAPACITY: 354
UNSATURATED IRON BINDING CAPACITY: 310
WHITE BLOOD CELL COUNT: 7.4

## 2022-12-07 ENCOUNTER — TELEPHONE ENCOUNTER (OUTPATIENT)
Dept: URBAN - METROPOLITAN AREA CLINIC 92 | Facility: CLINIC | Age: 42
End: 2022-12-07

## 2023-03-16 ENCOUNTER — LAB OUTSIDE AN ENCOUNTER (OUTPATIENT)
Dept: URBAN - NONMETROPOLITAN AREA CLINIC 2 | Facility: CLINIC | Age: 43
End: 2023-03-16

## 2023-03-16 ENCOUNTER — OFFICE VISIT (OUTPATIENT)
Dept: URBAN - METROPOLITAN AREA MEDICAL CENTER 1 | Facility: MEDICAL CENTER | Age: 43
End: 2023-03-16
Payer: COMMERCIAL

## 2023-03-16 DIAGNOSIS — K51.00 ACUTE ULCERATIVE PANCOLITIS: ICD-10-CM

## 2023-03-16 PROCEDURE — 45380 COLONOSCOPY AND BIOPSY: CPT | Performed by: INTERNAL MEDICINE

## 2023-03-17 ENCOUNTER — TELEPHONE ENCOUNTER (OUTPATIENT)
Dept: URBAN - METROPOLITAN AREA CLINIC 35 | Facility: CLINIC | Age: 43
End: 2023-03-17

## 2023-03-17 LAB
AP CASE REPORT: (no result)
AP FINAL DIAGNOSIS: (no result)
AP GROSS DESCRIPTION: (no result)
AP MICROSCOPIC DESCRIPTION: (no result)

## 2023-03-17 RX ORDER — INFLIXIMAB 100 MG/10ML
INFUSE 5 MG/KG OVER NO LESS THAN 2-6 HOUR(S) BY INTRAVENOUS ROUTE EVERY 8 WEEKS. PRE-MEDICATE WITH TYLENOL 650MG PO & BENADRYL 25MG IV PRIOR TO EACH INFUSION INJECTION, POWDER, LYOPHILIZED, FOR SOLUTION INTRAVENOUS
Qty: 1 | Refills: 6 | Status: ON HOLD | COMMUNITY
Start: 2017-08-31

## 2023-03-17 RX ORDER — CHLORHEXIDINE GLUCONATE 4 %
LIQUID (ML) TOPICAL
Qty: 0 | Refills: 0 | COMMUNITY
Start: 1900-01-01

## 2023-03-17 RX ORDER — DIPHENHYDRAMINE HYDROCHLORIDE 12.5 MG/5ML
10 ML SOLUTION ORAL
Qty: 1 UNSPECIFIED | Refills: 8 | Status: ACTIVE | COMMUNITY
Start: 2022-11-08

## 2023-03-17 RX ORDER — INFLIXIMAB 100 MG/1
10MG/KG INJECTION, POWDER, LYOPHILIZED, FOR SOLUTION INTRAVENOUS
Qty: 4 UNSPECIFIED | Refills: 8 | Status: ACTIVE | COMMUNITY
Start: 2022-08-10 | End: 2023-08-23

## 2023-03-17 RX ORDER — ACETAMINOPHEN 325 MG
2 TABLET TABLET ORAL
Qty: 1 UNSPECIFIED | Refills: 8 | Status: ACTIVE | COMMUNITY
Start: 2022-11-08 | End: 2023-11-21

## 2023-03-17 RX ORDER — MESALAMINE 1000 MG/1
1 SUPPOSITORY AT BEDTIME SUPPOSITORY RECTAL ONCE A DAY
Qty: 28 SUPPOSITORIES | Refills: 3 | OUTPATIENT
Start: 2023-03-17 | End: 2023-05-12

## 2023-03-17 RX ORDER — PREDNISONE 10 MG/1
4 TAB PO QD X 5 D, 3 TAB PO QD X 5 DAYS, 2 TAB PO QD X  5 D, 1 TAB PO QD X 5 DAYS, STOP TABLET ORAL AS DIRECTED
Qty: 50 | Refills: 0 | Status: ON HOLD | COMMUNITY

## 2023-03-17 RX ORDER — INFLIXIMAB 100 MG/1
10MG/KG INJECTION, POWDER, LYOPHILIZED, FOR SOLUTION INTRAVENOUS
Qty: 1 UNSPECIFIED | Refills: 8 | Status: ACTIVE | COMMUNITY
Start: 2022-11-08 | End: 2023-11-21

## 2023-04-20 ENCOUNTER — OFFICE VISIT (OUTPATIENT)
Dept: URBAN - NONMETROPOLITAN AREA CLINIC 2 | Facility: CLINIC | Age: 43
End: 2023-04-20

## 2023-05-09 ENCOUNTER — OFFICE VISIT (OUTPATIENT)
Dept: URBAN - NONMETROPOLITAN AREA CLINIC 2 | Facility: CLINIC | Age: 43
End: 2023-05-09

## 2023-08-03 ENCOUNTER — TELEPHONE ENCOUNTER (OUTPATIENT)
Dept: URBAN - NONMETROPOLITAN AREA CLINIC 2 | Facility: CLINIC | Age: 43
End: 2023-08-03

## 2023-08-03 RX ORDER — INFLIXIMAB 100 MG/10ML
AS DIRECTED INJECTION, POWDER, LYOPHILIZED, FOR SOLUTION INTRAVENOUS
Qty: 100 | Refills: 0 | OUTPATIENT
Start: 2023-08-03 | End: 2023-09-02

## 2023-08-03 RX ORDER — DIPHENHYDRAMINE HCL 2 %
1 CAPSULE AT BEDTIME AS NEEDED CREAM (GRAM) TOPICAL ONCE A DAY
Qty: 30 | Refills: 0 | OUTPATIENT
Start: 2023-08-03 | End: 2023-09-02

## 2023-08-03 RX ORDER — ACETAMINOPHEN 650 MG
2 TABLETS AS NEEDED TABLET, EXTENDED RELEASE ORAL
Qty: 6 | Refills: 0 | OUTPATIENT
Start: 2023-08-03 | End: 2023-08-04

## 2023-09-29 ENCOUNTER — WEB ENCOUNTER (OUTPATIENT)
Dept: URBAN - NONMETROPOLITAN AREA CLINIC 2 | Facility: CLINIC | Age: 43
End: 2023-09-29

## 2023-09-29 ENCOUNTER — OFFICE VISIT (OUTPATIENT)
Dept: URBAN - NONMETROPOLITAN AREA CLINIC 2 | Facility: CLINIC | Age: 43
End: 2023-09-29
Payer: COMMERCIAL

## 2023-09-29 ENCOUNTER — DASHBOARD ENCOUNTERS (OUTPATIENT)
Age: 43
End: 2023-09-29

## 2023-09-29 VITALS
DIASTOLIC BLOOD PRESSURE: 84 MMHG | BODY MASS INDEX: 25.66 KG/M2 | SYSTOLIC BLOOD PRESSURE: 142 MMHG | WEIGHT: 154 LBS | HEIGHT: 65 IN | TEMPERATURE: 98.67 F

## 2023-09-29 DIAGNOSIS — B37.0 THRUSH, ORAL: ICD-10-CM

## 2023-09-29 DIAGNOSIS — D72.829 LEUKOCYTOSIS, UNSPECIFIED: ICD-10-CM

## 2023-09-29 DIAGNOSIS — R94.5 ABNORMAL LIVER FUCTION TESTS: ICD-10-CM

## 2023-09-29 DIAGNOSIS — Z12.11 COLON CANCER SCREENING: ICD-10-CM

## 2023-09-29 DIAGNOSIS — K51.90 ULCERATIVE COLITIS: ICD-10-CM

## 2023-09-29 DIAGNOSIS — K62.5 RECTAL BLEEDING: ICD-10-CM

## 2023-09-29 PROBLEM — 79740000: Status: ACTIVE | Noted: 2023-09-29

## 2023-09-29 PROBLEM — 305058001: Status: ACTIVE | Noted: 2021-03-25

## 2023-09-29 PROBLEM — 111583006: Status: ACTIVE | Noted: 2021-06-11

## 2023-09-29 PROCEDURE — 99214 OFFICE O/P EST MOD 30 MIN: CPT | Performed by: NURSE PRACTITIONER

## 2023-09-29 RX ORDER — ACETAMINOPHEN 325 MG
2 TABLET TABLET ORAL
Qty: 1 UNSPECIFIED | Refills: 8 | Status: ACTIVE | COMMUNITY
Start: 2022-11-08 | End: 2023-11-21

## 2023-09-29 RX ORDER — CHLORHEXIDINE GLUCONATE 4 %
LIQUID (ML) TOPICAL
Qty: 0 | Refills: 0 | COMMUNITY
Start: 1900-01-01

## 2023-09-29 RX ORDER — INFLIXIMAB 100 MG/10ML
INFUSE 5 MG/KG OVER NO LESS THAN 2-6 HOUR(S) BY INTRAVENOUS ROUTE EVERY 8 WEEKS. PRE-MEDICATE WITH TYLENOL 650MG PO & BENADRYL 25MG IV PRIOR TO EACH INFUSION INJECTION, POWDER, LYOPHILIZED, FOR SOLUTION INTRAVENOUS
Qty: 1 | Refills: 6 | Status: ON HOLD | COMMUNITY
Start: 2017-08-31

## 2023-09-29 RX ORDER — INFLIXIMAB 100 MG/1
10MG/KG INJECTION, POWDER, LYOPHILIZED, FOR SOLUTION INTRAVENOUS
Qty: 1 UNSPECIFIED | Refills: 8 | Status: ACTIVE | COMMUNITY
Start: 2022-11-08 | End: 2023-11-21

## 2023-09-29 RX ORDER — PREDNISONE 10 MG/1
4 TAB PO QD X 5 D, 3 TAB PO QD X 5 DAYS, 2 TAB PO QD X  5 D, 1 TAB PO QD X 5 DAYS, STOP TABLET ORAL AS DIRECTED
Qty: 50 | Refills: 0 | Status: ON HOLD | COMMUNITY

## 2023-09-29 RX ORDER — NYSTATIN 100000 [USP'U]/ML
4 ML SUSPENSION ORAL
Qty: 224 ML | Refills: 0 | OUTPATIENT
Start: 2023-09-29 | End: 2023-10-13

## 2023-09-29 RX ORDER — DIPHENHYDRAMINE HYDROCHLORIDE 12.5 MG/5ML
10 ML SOLUTION ORAL
Qty: 1 UNSPECIFIED | Refills: 8 | Status: ON HOLD | COMMUNITY
Start: 2022-11-08

## 2023-09-29 RX ORDER — MESALAMINE 1000 MG/1
1 SUPPOSITORY AT BEDTIME SUPPOSITORY RECTAL ONCE A DAY
Qty: 30 SUPPOSITORIES | Refills: 11 | OUTPATIENT
Start: 2023-09-29 | End: 2024-09-23

## 2023-09-29 NOTE — HPI-TODAY'S VISIT:
11/11/19 Margy presents for follow up of UC on Remicade 5mg/kg every 8 weeks. Since her last visit she developed constipation x 5 days with rectal pain and impaction. She had BRBPR on disimpaction and has had discomfort/bleeding since. She denies fever, chills, abdominal pain or diarrhea, her stools are actually formed. Her last colonoscpy was in 11/2018 by Dr. Gutierrez with mild rectal disease. MB  3/24/20 Margy presents today for a telehealth visit via [employer].  She was having bleeding at our last visit.  She did uceris foam and colace.  Now she is working 2 jobs and she has had increase stress.  She feels that things are taking a toll, but she is almost down to one shift.  So, now she is having bloating, cramping and mucus with some blood.  She started using the foam, and she is now better.  She is concerned because she feels that she is getting exposed to COVID 19 patients.  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  WE have discussed her increased risk and the importance of her maintaining on Remicade.  4/29/2020 Margy presents for follow up fo UP. She is doing better in regard to her syptoms with no further bleeding. 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 MB  6/5/2020 Margy presents for follow up of UP. She is doing well today. Her symptoms have resolved. She is doing well on Remicade with normal BMs or bleeding. MB  7/2/2020 Margy presents for follow up of UP. She is still doing well with no further flares. She is preparing to re-enter work and anxious, but plans to discuss her options with her employer. MB  1/27/2021 Margy presents for followup of UP. Since her last visit she has had a flare with mucous, bleeding and diarrhea. Her last remicade infusion at 5mg/kg eveyr 8 weeks was in the first of January. She states she didn't feel better. She has used the uceris on and off for mild bleeding. She states she usually goes 1-3 times a week. Her stools are soft when she does have a BM. She has tensmus and has passed a large amount mucous and blood today. She is having some discomfort. She agrees her diet has been poor. Today she is not doing well. MB  3/25/2021 Margy presents for follow-up of ulcerative colitis status post colonoscopy with active proctitis.  She states 2 months ago when she got her Remicade infusion she felt like the drug did not work at all as usual.  She required a long steroid taper for relief of bleeding, mucus and tenesmus.  Since completing the taper she still has active inflammation and on her colonoscopy recently done by Dr. Priteo and continues to struggle with mucus and tenesmus.  Today we had a long discussion and she does feel like Remicade works well, however recently she has noticed a decrease in symptom control later in the weeks after her infusion and no improvement with her most recent infusion.  She agrees to check antibody levels today.  If these are normal she would like to increase the dose and the frequency.  MB 11/8/2022 Margy presents for follow-up of ulcerative colitis.  Since her last visit we increased her Renflexis to 10 mg/kg every 6 weeks.  She is having 1-3 bowel movements daily, she still has some mild urgency and tenesmus along with mucus.  She feels best on Hernandez enemas as needed.  Her last colonoscopy was done in March 2021 with mild proctitis on exam, normal colonic mucosa, her biopsy show moderate active proctitis normal random colon biopsies.  Today she is due for repeat surveillance this spring, we will schedule this.  I am going to call in Hernandez enemas for her to use as needed as her disease is distal.  You serous foam has worked the best in the past but her insurance will not approve this.  Consider alternative therapies depending on her repeat surveillance colonoscopy plus or minus her clinical response.  MB 9/29/2023 Margy presents for follow-up of ulcerative proctitis.  Since her last visit her repeat colonoscopy in March reveals mild right disease endoscopically, and mild active inflammation on biopsies.  She is on Renflexis 10 mg/kg every 6 weeks.  She does have occasional mucus and she uses Canasa suppositories.  Today we have discussed alternatives including her invoke, she does have active disease but this is very mild.  At this time she will continue Renflexis, if she has a flare would consider alternative therapy.  She is due for repeat labs.  She recently had a UTI and took Bactrim and now has oral thrush, will treat this with nystatin.  MB

## 2023-11-30 ENCOUNTER — LAB OUTSIDE AN ENCOUNTER (OUTPATIENT)
Dept: URBAN - NONMETROPOLITAN AREA CLINIC 2 | Facility: CLINIC | Age: 43
End: 2023-11-30

## 2023-11-30 LAB
A/G RATIO: 1.3
ALBUMIN: 4.5
ALKALINE PHOSPHATASE: 33
ALT (SGPT): 20
ANION GAP: 12
AST (SGOT): 25
BASOPHILS AUTOMATED ABSOLUTE COUNT: 0.1
BASOPHILS RELATIVE PERCENT: 0.8
BILIRUBIN TOTAL: 0.7
BLOOD UREA NITROGEN: 12
BUN / CREAT RATIO: 18
CALCIUM: 9.5
CHLORIDE: 102
CO2: 27
CREATININE, SERUM: 0.66
EGFR (CKD-EPI): >60
EOSINOPHILS AUTOMATED ABSOLUTE COUNT: 0.3
EOSINOPHILS RELATIVE PERCENT: 4
GLUCOSE: 82
HEMATOCRIT: 38.4
HEMOGLOBIN: 12.8
HEPATITIS B SURFACE ANTIGEN: (no result)
LYMPHOCYTES AUTOMATED ABSOLUTE COUNT: 3.1
LYMPHOCYTES RELATIVE PERCENT: 45.2
MEAN CORPUSCULAR HEMOGLOBIN CONC: 33.5
MEAN CORPUSCULAR HEMOGLOBIN: 29.8
MEAN CORPUSCULAR VOLUME: 88.9
MEAN PLATELET VOLUME: 8
MONOCYTES AUTOMATED ABSOLUTE COUNT: 0.6
MONOCYTES RELATIVE PERCENT: 8.4
NEUTROPHILS AUTOMATED ABSOLUTE: 2.8
NEUTROPHILS RELATIVE PERCENT: 41.6
PLATELET COUNT: 264
POTASSIUM: 3.7
PROTEIN TOTAL: 8
RED BLOOD CELL COUNT: 4.32
RED CELL DISTRIBUTION WIDTH: 13.3
SODIUM: 137
WHITE BLOOD CELL COUNT: 6.8

## 2023-12-03 LAB — HEPATITIS B CORE TOTAL ANTIBODY: NONREACTIVE

## 2023-12-12 LAB
MITOGEN-NIL: (no result)
NIL: (no result)
QUANTIFERON-TB PLUS,1T: (no result)
TB1-NIL: (no result)
TB2-NIL: (no result)

## 2024-06-26 ENCOUNTER — TELEPHONE ENCOUNTER (OUTPATIENT)
Dept: URBAN - NONMETROPOLITAN AREA CLINIC 2 | Facility: CLINIC | Age: 44
End: 2024-06-26

## 2024-06-27 ENCOUNTER — TELEPHONE ENCOUNTER (OUTPATIENT)
Dept: URBAN - NONMETROPOLITAN AREA CLINIC 2 | Facility: CLINIC | Age: 44
End: 2024-06-27

## 2024-06-27 RX ORDER — NYSTATIN 100000 [USP'U]/ML
4 ML SUSPENSION ORAL
Qty: 224 | OUTPATIENT
Start: 2024-06-27 | End: 2024-07-11

## 2024-07-02 ENCOUNTER — OFFICE VISIT (OUTPATIENT)
Dept: URBAN - NONMETROPOLITAN AREA CLINIC 2 | Facility: CLINIC | Age: 44
End: 2024-07-02

## 2024-07-30 ENCOUNTER — TELEPHONE ENCOUNTER (OUTPATIENT)
Dept: URBAN - NONMETROPOLITAN AREA CLINIC 2 | Facility: CLINIC | Age: 44
End: 2024-07-30

## 2024-07-30 RX ORDER — INFLIXIMAB 100 MG/10ML
AS DIRECTED INJECTION, POWDER, LYOPHILIZED, FOR SOLUTION INTRAVENOUS
OUTPATIENT
Start: 2024-07-31

## 2024-09-24 ENCOUNTER — OFFICE VISIT (OUTPATIENT)
Dept: URBAN - NONMETROPOLITAN AREA CLINIC 2 | Facility: CLINIC | Age: 44
End: 2024-09-24

## 2024-11-15 ENCOUNTER — OFFICE VISIT (OUTPATIENT)
Dept: URBAN - NONMETROPOLITAN AREA CLINIC 2 | Facility: CLINIC | Age: 44
End: 2024-11-15

## 2025-07-23 ENCOUNTER — TELEPHONE ENCOUNTER (OUTPATIENT)
Dept: URBAN - NONMETROPOLITAN AREA CLINIC 2 | Facility: CLINIC | Age: 45
End: 2025-07-23

## 2025-08-13 ENCOUNTER — TELEPHONE ENCOUNTER (OUTPATIENT)
Dept: URBAN - NONMETROPOLITAN AREA CLINIC 2 | Facility: CLINIC | Age: 45
End: 2025-08-13

## 2025-08-13 ENCOUNTER — LAB OUTSIDE AN ENCOUNTER (OUTPATIENT)
Dept: URBAN - NONMETROPOLITAN AREA CLINIC 2 | Facility: CLINIC | Age: 45
End: 2025-08-13

## 2025-08-13 ENCOUNTER — OFFICE VISIT (OUTPATIENT)
Dept: URBAN - NONMETROPOLITAN AREA CLINIC 2 | Facility: CLINIC | Age: 45
End: 2025-08-13
Payer: COMMERCIAL

## 2025-08-13 DIAGNOSIS — Z12.11 COLON CANCER SCREENING: ICD-10-CM

## 2025-08-13 DIAGNOSIS — R94.5 ABNORMAL LIVER FUCTION TESTS: ICD-10-CM

## 2025-08-13 DIAGNOSIS — K62.5 RECTAL BLEEDING: ICD-10-CM

## 2025-08-13 DIAGNOSIS — K51.90 ULCERATIVE COLITIS: ICD-10-CM

## 2025-08-13 PROCEDURE — 99214 OFFICE O/P EST MOD 30 MIN: CPT | Performed by: NURSE PRACTITIONER

## 2025-08-13 RX ORDER — INFLIXIMAB 100 MG/10ML
INFUSE 5 MG/KG OVER NO LESS THAN 2-6 HOUR(S) BY INTRAVENOUS ROUTE EVERY 8 WEEKS. PRE-MEDICATE WITH TYLENOL 650MG PO & BENADRYL 25MG IV PRIOR TO EACH INFUSION INJECTION, POWDER, LYOPHILIZED, FOR SOLUTION INTRAVENOUS
Qty: 1 | Refills: 6 | Status: ON HOLD | COMMUNITY
Start: 2017-08-31

## 2025-08-13 RX ORDER — CHLORHEXIDINE GLUCONATE 4 %
LIQUID (ML) TOPICAL
Qty: 0 | Refills: 0 | COMMUNITY
Start: 1900-01-01

## 2025-08-13 RX ORDER — PREDNISONE 10 MG/1
4 TAB PO QD X 5 D, 3 TAB PO QD X 5 DAYS, 2 TAB PO QD X  5 D, 1 TAB PO QD X 5 DAYS, STOP TABLET ORAL AS DIRECTED
Qty: 50 | Refills: 0 | Status: ON HOLD | COMMUNITY

## 2025-08-13 RX ORDER — INFLIXIMAB 100 MG/10ML
10MG/KG EVERY 6 WEEKS INJECTION, POWDER, LYOPHILIZED, FOR SOLUTION INTRAVENOUS
Qty: 1 | Refills: 6
Start: 2024-07-31 | End: 2026-06-03

## 2025-08-13 RX ORDER — INFLIXIMAB 100 MG/10ML
AS DIRECTED INJECTION, POWDER, LYOPHILIZED, FOR SOLUTION INTRAVENOUS
Status: ACTIVE | COMMUNITY
Start: 2024-07-31

## 2025-08-13 RX ORDER — DIPHENHYDRAMINE HYDROCHLORIDE 12.5 MG/5ML
10 ML SOLUTION ORAL
Qty: 1 UNSPECIFIED | Refills: 8 | Status: ON HOLD | COMMUNITY
Start: 2022-11-08

## 2025-08-13 RX ORDER — MESALAMINE 1000 MG/1
1 SUPPOSITORY AT BEDTIME SUPPOSITORY RECTAL ONCE A DAY
Qty: 30 | Refills: 3 | OUTPATIENT
Start: 2025-08-13

## 2025-08-15 ENCOUNTER — TELEPHONE ENCOUNTER (OUTPATIENT)
Dept: URBAN - METROPOLITAN AREA CLINIC 23 | Facility: CLINIC | Age: 45
End: 2025-08-15

## 2025-08-18 ENCOUNTER — LAB OUTSIDE AN ENCOUNTER (OUTPATIENT)
Dept: URBAN - NONMETROPOLITAN AREA CLINIC 2 | Facility: CLINIC | Age: 45
End: 2025-08-18

## 2025-08-18 LAB
A/G RATIO: 1.4
ALBUMIN: 4.4
ALKALINE PHOSPHATASE: 28
ALT (SGPT): 15
ANION GAP: 13
AST (SGOT): 20
BASOPHILS AUTOMATED ABSOLUTE COUNT: 0
BASOPHILS RELATIVE PERCENT: 0.5
BILIRUBIN TOTAL: 0.5
BLOOD UREA NITROGEN: 17
BUN / CREAT RATIO: 22
C-REACTIVE PROTEIN: 0.14
CALCIUM: 9.6
CHLORIDE: 101
CO2: 28
CREATININE, SERUM: 0.77
EGFR (CKD-EPI): >60
EOSINOPHILS AUTOMATED ABSOLUTE COUNT: 0
EOSINOPHILS RELATIVE PERCENT: 0.5
GLUCOSE: 75
HEMATOCRIT: 40.5
HEMOGLOBIN: 13.5
HEPATITIS B SURFACE ANTIGEN: (no result)
LYMPHOCYTES AUTOMATED ABSOLUTE COUNT: 2.7
LYMPHOCYTES RELATIVE PERCENT: 34.8
MEAN CORPUSCULAR HEMOGLOBIN CONC: 33.4
MEAN CORPUSCULAR HEMOGLOBIN: 30.8
MEAN CORPUSCULAR VOLUME: 92.2
MEAN PLATELET VOLUME: 8.4
MONOCYTES AUTOMATED ABSOLUTE COUNT: 0.5
MONOCYTES RELATIVE PERCENT: 6.2
NEUTROPHILS AUTOMATED ABSOLUTE: 4.6
NEUTROPHILS RELATIVE PERCENT: 58
PLATELET COUNT: 295
POTASSIUM: 3.7
PROTEIN TOTAL: 7.5
RED BLOOD CELL COUNT: 4.4
RED CELL DISTRIBUTION WIDTH: 12.8
SEDIMENTATION RATE, ERYTHROCYTE: 10
SODIUM: 138
WHITE BLOOD CELL COUNT: 7.9

## 2025-08-20 LAB — HEPATITIS B CORE TOTAL ANTIBODY: NONREACTIVE

## 2025-08-21 ENCOUNTER — TELEPHONE ENCOUNTER (OUTPATIENT)
Dept: URBAN - NONMETROPOLITAN AREA CLINIC 2 | Facility: CLINIC | Age: 45
End: 2025-08-21

## 2025-08-21 LAB
MITOGEN-NIL: >10
NIL: 0.11
QUANTIFERON-TB PLUS,1T: NEGATIVE
TB1-NIL: 0.04
TB2-NIL: 0.03

## 2025-08-25 ENCOUNTER — TELEPHONE ENCOUNTER (OUTPATIENT)
Dept: URBAN - NONMETROPOLITAN AREA CLINIC 2 | Facility: CLINIC | Age: 45
End: 2025-08-25